# Patient Record
Sex: MALE | Race: WHITE | NOT HISPANIC OR LATINO | Employment: OTHER | URBAN - METROPOLITAN AREA
[De-identification: names, ages, dates, MRNs, and addresses within clinical notes are randomized per-mention and may not be internally consistent; named-entity substitution may affect disease eponyms.]

---

## 2019-04-15 NOTE — PROGRESS NOTES
Subjective:     Encounter Date:04/17/2019    Patient ID: Don Hoang is a 68 y.o.  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    SELF REFERRED  INTERNIST: Marcus Colunga MD    Chief Complaint:   Chief Complaint   Patient presents with   • Hypertension     Consult    • Hyperlipidemia     Problem List:  1. Hypertensive cardiovascular disease:  a. Remote stress test approximately 2011; negative for patient-diet deficit  b. Residual class I symptoms with acceptable EKG, April 2019  2. Hypertension  3. Hyperlipidemia; not on statin therapy, ASCVD 10-year risk 17.4%, 10.4% if treated  4. BPH  5. GERD  6. Hyperplastic colon polyp  7. Osteoarthritis  8. Pedal neuropathy  9. Former tobacco use: One pack per day x20 years, quit 27 years ago  10. Alcohol use; 3-4 beers per day  11. Osteoarthritis  12. Surgical history:  a. Vasectomy  b. Right orchiectomy  c. EGD/colonoscopy  d. Tonsillectomy    Allergies   Allergen Reactions   • Cortisone Rash   • Erythromycin Rash   • Iodine Rash         Current Outpatient Medications:   •  aspirin 81 MG EC tablet, Take 81 mg by mouth Daily., Disp: , Rfl:   •  Biotin 50533 MCG tablet, Take  by mouth Daily., Disp: , Rfl:   •  Cranberry-Vitamin C-Probiotic (AZO CRANBERRY PO), Take  by mouth Daily., Disp: , Rfl:   •  losartan-hydrochlorothiazide (HYZAAR) 100-12.5 MG per tablet, Daily., Disp: , Rfl: 10  •  Methylcellulose, Laxative, (CITRUCEL PO), Take  by mouth Daily., Disp: , Rfl:   •  metoprolol succinate XL (TOPROL-XL) 50 MG 24 hr tablet, Daily., Disp: , Rfl: 9  •  omeprazole (priLOSEC) 20 MG capsule, Daily., Disp: , Rfl: 10  •  Probiotic Product ([employer] PO), Take  by mouth Daily., Disp: , Rfl:   •  Zinc 50 MG capsule, Take  by mouth Daily., Disp: , Rfl:     History of Present Illness  This is a 68-year-old white male who presents to establish cardiology care.  He had a stress test about 7-8 years ago after  "having some shoulder pain.  He has known hypertension, GERD, and hyperlipidemia.  He denies any MIs, heart catheterizations, heart monitors, arrhythmias, rheumatic fever, thyroid dysfunction, kidney dysfunction, CVAs, TIAs, seizures, DVTs, PEs, COPD, asthma, or cardiomegaly.  He was told that, as a child, he had \"symptoms of rheumatic fever\" and had a tonsillectomy.  He remotely smoked 1 pack a day x20 years but quit 27 years ago.  He has no family history of early CAD.  He denies any chest pain, shortness of breath, dizziness, palpitations, presyncope, syncope, or edema.  He is able to perform all of his activities without any difficulties.  He was recently told that he has some neuropathy in his feet.  He denies any diabetes mellitus and states that if his feet are warm, he does not experience this neuropathy.  He denies any claudication while ambulating.  Patient otherwise denies chest pain, shortness of breath, PND, edema, palpitations, syncope or presyncope at this time.    Cardiovascular Disease Risk Factors  hyptertension, hyperlipidemia, increased age, male gender    Social History     Socioeconomic History   • Marital status:      Spouse name: Not on file   • Number of children: 2   • Years of education: Not on file   • Highest education level: Not on file   Tobacco Use   • Smoking status: Former Smoker     Years: 20.00     Types: Cigarettes   • Smokeless tobacco: Never Used   Substance and Sexual Activity   • Alcohol use: Yes   • Drug use: No   • Sexual activity: Defer     · Mother:  from ALS at 76  · Father:  at 79 from neurologic issues  · Brother:  from esophageal cancer  · Brother: Lung cancer  · Son and daughter: Healthy    Review of Systems   Constitution: Negative.   HENT: Positive for hearing loss.    Eyes: Negative.    Cardiovascular: Negative for chest pain, claudication, cyanosis, dyspnea on exertion, irregular heartbeat, leg swelling, near-syncope, orthopnea, " "palpitations, paroxysmal nocturnal dyspnea and syncope.   Respiratory: Negative for shortness of breath, sleep disturbances due to breathing and snoring.    Endocrine: Negative.    Hematologic/Lymphatic: Negative.    Skin: Negative.    Musculoskeletal: Positive for arthritis.   Gastrointestinal: Positive for heartburn. Negative for melena.   Genitourinary: Negative.    Neurological: Positive for numbness. Negative for excessive daytime sleepiness, dizziness, focal weakness and seizures.   Psychiatric/Behavioral: Negative.    Allergic/Immunologic: Negative.       Obtained and negative except as outlined in problem list and HPI.      ECG 12 Lead  Date/Time: 4/17/2019 11:22 AM  Performed by: Jacobo Ramirez MD  Authorized by: Jacobo Ramirez MD   Rhythm comments: Normal sinus rhythm, normal ECG, 75 bpm, QRS 92 ms,  ms,  ms                 Objective:       Vitals:    04/17/19 1002 04/17/19 1003 04/17/19 1004 04/17/19 1006   BP: 172/85 173/81 177/85 171/81   BP Location: Left arm Left arm Right arm Right arm   Patient Position: Sitting Standing Standing Sitting   Pulse: 73 75 78 73   Weight: 99.1 kg (218 lb 6.4 oz)      Height: 188 cm (74\")      Recheck blood pressure left arm sitting was 138/68  Body mass index is 28.04 kg/m².     Physical Exam   Constitutional: He is oriented to person, place, and time. He appears well-developed and well-nourished.   HENT:   Mouth/Throat: Uvula is midline, oropharynx is clear and moist and mucous membranes are normal. He does not have dentures. No oral lesions. Normal dentition. No dental abscesses, uvula swelling, lacerations or dental caries.   Eyes:   Fundoscopic exam:       The right eye shows AV nicking. The right eye shows no exudate and no hemorrhage.        The left eye shows AV nicking. The left eye shows no exudate and no hemorrhage.   Right cataract   Neck: No JVD present. Carotid bruit is not present. No thyromegaly present.   Cardiovascular: Regular rhythm, " S1 normal, S2 normal and normal heart sounds. Exam reveals no gallop, no S3 and no friction rub.   No murmur heard.  Pulses:       Dorsalis pedis pulses are 2+ on the right side, and 2+ on the left side.        Posterior tibial pulses are 2+ on the right side, and 2+ on the left side.   Pulmonary/Chest: Effort normal. He has decreased breath sounds. He has no wheezes. He has no rhonchi. He has no rales.   Abdominal: Soft. He exhibits no mass. There is no hepatosplenomegaly. There is no tenderness. There is no guarding.   Bowel sounds audible x4   Musculoskeletal: Normal range of motion. He exhibits no edema.   Lymphadenopathy:     He has no cervical adenopathy.   Neurological: He is alert and oriented to person, place, and time.   Skin: Skin is warm, dry and intact. No rash noted.   Vitals reviewed.      Lab Review:   11/12/2018:  · CBC: WBC 6.9, MPV 9.6, neutrophils 51.1, lymphocytes 30.4, monocytes 11.4, eos 6.5, RBC 5.26, beta 0.6, hemoglobin 15.7, hematocrit 47.5, MCV 90.3, MCH 29.8, MCHC 33.1, RDW 13.3, platelets 192  · CMP: Glucose 104, BUN 12, creatinine 0.7, sodium 139, potassium 4.7, chloride 100, carbon dioxide 33, calcium 9.3, protein 7.1, albumin 4.2, AST 36, ALT 49, alkaline phosphatase 58, bilirubin 1,   · Lipid panel: Triglycerides 159, cholesterol 117, HDL 37, LDL 48  · PSA - 0.63  · TSH - 3.15        Assessment:   Asymptomatic patient with uncontrolled hypertension.  We will order an echo GXT, CT cardiac calcium score, and add amlodipine 2.5 mg nightly.  Patient's ASCVD 10-year risk is 17.4%, 10.4% if treated.  We will add rosuvastatin 10 mg nightly.  He has concerns about his cardiac risk long-term in view of his remote history of tobacco use, as well as dyslipidemia and hypertension, as well as gender.  We will attempt to risk stratify him.     Diagnosis Plan   1. Essential hypertension  Echocardiographic GXT, amlodipine 2.5 mg nightly, CT cardiac calcium score   2. Hyperlipidemia,  unspecified hyperlipidemia type  ASCVD 10-year risk is 17.4%, 10.4% if treated.  We will add rosuvastatin 10 mg nightly   3. Family history of ASCVD (arteriosclerotic cardiovascular disease)  Echocardiographic GXT, amlodipine 2.5 mg nightly          Plan:       1. Patient to continue current medications and close follow up with the above providers with the following alterations:   A. Initiate amlodipine 2.5 mg hs daily   B. Initiate rosuvastatin 10 mg hs daily  2. Tentative cardiology follow up in 6 weeks or patient may return sooner PRN.  3. Echo GXT ordered  4. CT cardiac calcium score ordered  5. 1 800 card provided    Scribed for Jacobo Ramirez MD by ESDRAS Marin. 4/17/2019  11:26 AM    I, Jacobo Ramirez MD, [facility], personally performed the services described in this documentation as scribed by the above named individual in my presence, and it is both accurate and complete. At 11:49 AM on 04/17/2019

## 2019-04-17 ENCOUNTER — CONSULT (OUTPATIENT)
Dept: CARDIOLOGY | Facility: CLINIC | Age: 69
End: 2019-04-17

## 2019-04-17 VITALS
HEIGHT: 74 IN | WEIGHT: 218.4 LBS | HEART RATE: 73 BPM | SYSTOLIC BLOOD PRESSURE: 171 MMHG | BODY MASS INDEX: 28.03 KG/M2 | DIASTOLIC BLOOD PRESSURE: 81 MMHG

## 2019-04-17 DIAGNOSIS — Z82.49 FAMILY HISTORY OF ASCVD (ARTERIOSCLEROTIC CARDIOVASCULAR DISEASE): ICD-10-CM

## 2019-04-17 DIAGNOSIS — R06.02 SHORTNESS OF BREATH: ICD-10-CM

## 2019-04-17 DIAGNOSIS — E78.5 HYPERLIPIDEMIA, UNSPECIFIED HYPERLIPIDEMIA TYPE: ICD-10-CM

## 2019-04-17 DIAGNOSIS — I10 ESSENTIAL HYPERTENSION: Primary | ICD-10-CM

## 2019-04-17 PROCEDURE — 93000 ELECTROCARDIOGRAM COMPLETE: CPT | Performed by: INTERNAL MEDICINE

## 2019-04-17 PROCEDURE — 99204 OFFICE O/P NEW MOD 45 MIN: CPT | Performed by: INTERNAL MEDICINE

## 2019-04-17 RX ORDER — ASPIRIN 81 MG/1
81 TABLET ORAL DAILY
COMMUNITY

## 2019-04-17 RX ORDER — AMLODIPINE BESYLATE 2.5 MG/1
2.5 TABLET ORAL NIGHTLY
Qty: 30 TABLET | Refills: 11 | Status: SHIPPED | OUTPATIENT
Start: 2019-04-17 | End: 2019-05-31 | Stop reason: SDUPTHER

## 2019-04-17 RX ORDER — GLUCOSAMINE/D3/BOSWELLIA SERRA 1500MG-400
TABLET ORAL DAILY
COMMUNITY

## 2019-04-17 RX ORDER — LOSARTAN POTASSIUM AND HYDROCHLOROTHIAZIDE 12.5; 1 MG/1; MG/1
TABLET ORAL DAILY
Refills: 10 | COMMUNITY
Start: 2019-02-26 | End: 2020-06-05 | Stop reason: DRUGHIGH

## 2019-04-17 RX ORDER — OMEPRAZOLE 20 MG/1
20 CAPSULE, DELAYED RELEASE ORAL EVERY OTHER DAY
Refills: 10 | COMMUNITY
Start: 2019-02-26

## 2019-04-17 RX ORDER — ROSUVASTATIN CALCIUM 10 MG/1
10 TABLET, COATED ORAL DAILY
Qty: 30 TABLET | Refills: 11 | Status: SHIPPED | OUTPATIENT
Start: 2019-04-17 | End: 2019-05-31 | Stop reason: SDUPTHER

## 2019-04-17 RX ORDER — METOPROLOL SUCCINATE 50 MG/1
50 TABLET, EXTENDED RELEASE ORAL DAILY
Refills: 9 | COMMUNITY
Start: 2019-02-26

## 2019-05-16 ENCOUNTER — HOSPITAL ENCOUNTER (OUTPATIENT)
Dept: CT IMAGING | Facility: HOSPITAL | Age: 69
Discharge: HOME | End: 2019-05-16
Admitting: INTERNAL MEDICINE

## 2019-05-16 ENCOUNTER — TELEPHONE (OUTPATIENT)
Dept: CARDIOLOGY | Facility: CLINIC | Age: 69
End: 2019-05-16

## 2019-05-16 ENCOUNTER — HOSPITAL ENCOUNTER (OUTPATIENT)
Dept: CARDIOLOGY | Facility: HOSPITAL | Age: 69
Discharge: HOME | End: 2019-05-16
Admitting: INTERNAL MEDICINE

## 2019-05-16 DIAGNOSIS — Z82.49 FAMILY HISTORY OF ASCVD (ARTERIOSCLEROTIC CARDIOVASCULAR DISEASE): ICD-10-CM

## 2019-05-16 DIAGNOSIS — I10 ESSENTIAL HYPERTENSION: ICD-10-CM

## 2019-05-16 DIAGNOSIS — R06.02 SHORTNESS OF BREATH: ICD-10-CM

## 2019-05-16 DIAGNOSIS — E78.5 HYPERLIPIDEMIA, UNSPECIFIED HYPERLIPIDEMIA TYPE: ICD-10-CM

## 2019-05-16 LAB
BH CV ECHO MEAS - AO ROOT AREA (BSA CORRECTED): 1.6
BH CV ECHO MEAS - AO ROOT AREA: 9.6 CM^2
BH CV ECHO MEAS - AO ROOT DIAM: 3.5 CM
BH CV ECHO MEAS - BSA(HAYCOCK): 2.3 M^2
BH CV ECHO MEAS - BSA: 2.2 M^2
BH CV ECHO MEAS - BZI_BMI: 27.6 KILOGRAMS/M^2
BH CV ECHO MEAS - BZI_METRIC_HEIGHT: 188 CM
BH CV ECHO MEAS - BZI_METRIC_WEIGHT: 97.5 KG
BH CV ECHO MEAS - EDV(CUBED): 84 ML
BH CV ECHO MEAS - EDV(MOD-SP2): 107 ML
BH CV ECHO MEAS - EDV(MOD-SP4): 102 ML
BH CV ECHO MEAS - EDV(TEICH): 86.8 ML
BH CV ECHO MEAS - EF(CUBED): 64.5 %
BH CV ECHO MEAS - EF(MOD-BP): 69 %
BH CV ECHO MEAS - EF(MOD-SP2): 77.6 %
BH CV ECHO MEAS - EF(MOD-SP4): 68.6 %
BH CV ECHO MEAS - EF(TEICH): 56.3 %
BH CV ECHO MEAS - ESV(CUBED): 29.8 ML
BH CV ECHO MEAS - ESV(MOD-SP2): 24 ML
BH CV ECHO MEAS - ESV(MOD-SP4): 32 ML
BH CV ECHO MEAS - ESV(TEICH): 37.9 ML
BH CV ECHO MEAS - FS: 29.2 %
BH CV ECHO MEAS - IVS/LVPW: 1
BH CV ECHO MEAS - IVSD: 0.83 CM
BH CV ECHO MEAS - LA DIMENSION: 3.1 CM
BH CV ECHO MEAS - LA/AO: 0.89
BH CV ECHO MEAS - LV DIASTOLIC VOL/BSA (35-75): 45.5 ML/M^2
BH CV ECHO MEAS - LV MASS(C)D: 111.4 GRAMS
BH CV ECHO MEAS - LV MASS(C)DI: 49.7 GRAMS/M^2
BH CV ECHO MEAS - LV MAX PG: 5 MMHG
BH CV ECHO MEAS - LV MEAN PG: 2 MMHG
BH CV ECHO MEAS - LV SYSTOLIC VOL/BSA (12-30): 14.3 ML/M^2
BH CV ECHO MEAS - LV V1 MAX: 112 CM/SEC
BH CV ECHO MEAS - LV V1 MEAN: 73 CM/SEC
BH CV ECHO MEAS - LV V1 VTI: 23 CM
BH CV ECHO MEAS - LVIDD: 4.4 CM
BH CV ECHO MEAS - LVIDS: 3.1 CM
BH CV ECHO MEAS - LVLD AP2: 6.7 CM
BH CV ECHO MEAS - LVLD AP4: 6.3 CM
BH CV ECHO MEAS - LVLS AP2: 4.7 CM
BH CV ECHO MEAS - LVLS AP4: 5 CM
BH CV ECHO MEAS - LVOT AREA (M): 3.8 CM^2
BH CV ECHO MEAS - LVOT AREA: 3.8 CM^2
BH CV ECHO MEAS - LVOT DIAM: 2.2 CM
BH CV ECHO MEAS - LVPWD: 0.8 CM
BH CV ECHO MEAS - RAP SYSTOLE: 3 MMHG
BH CV ECHO MEAS - RVSP: 33.9 MMHG
BH CV ECHO MEAS - SI(CUBED): 24.2 ML/M^2
BH CV ECHO MEAS - SI(LVOT): 39 ML/M^2
BH CV ECHO MEAS - SI(MOD-SP2): 37 ML/M^2
BH CV ECHO MEAS - SI(MOD-SP4): 31.2 ML/M^2
BH CV ECHO MEAS - SI(TEICH): 21.8 ML/M^2
BH CV ECHO MEAS - SV(CUBED): 54.2 ML
BH CV ECHO MEAS - SV(LVOT): 87.4 ML
BH CV ECHO MEAS - SV(MOD-SP2): 83 ML
BH CV ECHO MEAS - SV(MOD-SP4): 70 ML
BH CV ECHO MEAS - SV(TEICH): 48.8 ML
BH CV ECHO MEAS - TR MAX PG: 30.9 MMHG
BH CV ECHO MEAS - TR MAX VEL: 278 CM/SEC
BH CV STRESS BP STAGE 1: NORMAL
BH CV STRESS BP STAGE 2: NORMAL
BH CV STRESS DURATION MIN STAGE 1: 3
BH CV STRESS DURATION MIN STAGE 2: 3
BH CV STRESS DURATION SEC STAGE 1: 0
BH CV STRESS DURATION SEC STAGE 2: 11
BH CV STRESS ECHO POST STRESS EJECTION FRACTION EF: 75 %
BH CV STRESS GRADE STAGE 1: 10
BH CV STRESS GRADE STAGE 2: 12
BH CV STRESS HR STAGE 1: 133
BH CV STRESS HR STAGE 2: 164
BH CV STRESS METS STAGE 1: 5
BH CV STRESS METS STAGE 2: 7.5
BH CV STRESS PROTOCOL 1: NORMAL
BH CV STRESS RECOVERY BP: NORMAL MMHG
BH CV STRESS RECOVERY HR: 104 BPM
BH CV STRESS SPEED STAGE 1: 1.7
BH CV STRESS SPEED STAGE 2: 2.5
BH CV STRESS STAGE 1: 1
BH CV STRESS STAGE 2: 2
BH CV VAS BP LEFT ARM: NORMAL MMHG
LV EF 2D ECHO EST: 67 %
PERCENT MAX PREDICTED HR: 107.19 %
STRESS BASELINE BP: NORMAL MMHG
STRESS BASELINE HR: 77 BPM
STRESS PERCENT HR: 126 %
STRESS POST ESTIMATED WORKLOAD: 7 METS
STRESS POST EXERCISE DUR MIN: 6 MIN
STRESS POST EXERCISE DUR SEC: 11 SEC
STRESS POST PEAK BP: NORMAL MMHG
STRESS POST PEAK HR: 164 BPM

## 2019-05-16 PROCEDURE — 93320 DOPPLER ECHO COMPLETE: CPT | Performed by: INTERNAL MEDICINE

## 2019-05-16 PROCEDURE — 93325 DOPPLER ECHO COLOR FLOW MAPG: CPT | Performed by: INTERNAL MEDICINE

## 2019-05-16 PROCEDURE — 93320 DOPPLER ECHO COMPLETE: CPT

## 2019-05-16 PROCEDURE — 93018 CV STRESS TEST I&R ONLY: CPT | Performed by: INTERNAL MEDICINE

## 2019-05-16 PROCEDURE — 93325 DOPPLER ECHO COLOR FLOW MAPG: CPT

## 2019-05-16 PROCEDURE — 93350 STRESS TTE ONLY: CPT | Performed by: INTERNAL MEDICINE

## 2019-05-16 PROCEDURE — 75571 CT HRT W/O DYE W/CA TEST: CPT

## 2019-05-16 PROCEDURE — 93017 CV STRESS TEST TRACING ONLY: CPT

## 2019-05-16 PROCEDURE — 93350 STRESS TTE ONLY: CPT

## 2019-05-16 NOTE — TELEPHONE ENCOUNTER
Wife came into office with copies of lab work for patient from 2016, 2017, and 2018.  She is concerned that Dr. Ramirez placed patient on Rosuvastatin based on his triglyceride level from 2017 which she believes is not accurate.    Triglycerides 2016 129  Triglycerides  2017 363  Triglycerides 2018 159    Advised will speak with Dr. Ramirez and let her know his recommendations.

## 2019-05-30 NOTE — PROGRESS NOTES
Subjective:     Encounter Date:05/31/2019    Patient ID: Papo Hoang is a 68 y.o.  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     SELF REFERRED  INTERNIST: Marcus Colunga MD    Chief Complaint:   Chief Complaint   Patient presents with   • Hypertension   • Hyperlipidemia     Problem List:  1. Hypertensive cardiovascular disease:  a. Remote stress test approximately 2011; negative for patient-diet deficit  b. Residual class I symptoms with acceptable EKG, April 2019, with acceptable echocardiographic exercise stress test suggestive of low probability for significant focal obstructive coronary artery disease with preserved systolic left ventricular function following exercise to 79% predicted exercise capacity and 107% of predicted maximum heart rate suggestive of mild physical deconditioning and cardiac CT calcium score of 26.3, May 2019.  c. Residual class I symptoms, May 2019  2. Hypertension - probably essential  3. Hyperlipidemia; now on statin therapy, ASCVD 10-year risk 17.4%, 10.4% if treated  4. BPH  5. GERD  6. Hyperplastic colon polyp  7. Osteoarthritis  8. Pedal neuropathy  9. Former tobacco use: One pack per day x20 years; quit 27 years ago  10. Alcohol use; 3-4 beers per day  11. Osteoarthritis  12. Surgical history:  a. Vasectomy  b. Right orchiectomy  c. EGD/colonoscopy  d. Tonsillectomy       Allergies   Allergen Reactions   • Cortisone Rash   • Erythromycin Rash   • Iodine Rash       Current Outpatient Medications:   •  amLODIPine (NORVASC) 2.5 MG tablet, Take 1 tablet by mouth Every Night., Disp: 30 tablet, Rfl: 11  •  aspirin 81 MG EC tablet, Take 81 mg by mouth Daily., Disp: , Rfl:   •  Biotin 88475 MCG tablet, Take  by mouth Daily., Disp: , Rfl:   •  Cranberry-Vitamin C-Probiotic (AZO CRANBERRY PO), Take  by mouth Daily., Disp: , Rfl:   •  losartan-hydrochlorothiazide (HYZAAR) 100-12.5 MG per tablet, Daily., Disp: , Rfl: 10  •   "Methylcellulose, Laxative, (CITRUCEL PO), Take  by mouth Daily., Disp: , Rfl:   •  metoprolol succinate XL (TOPROL-XL) 50 MG 24 hr tablet, Daily., Disp: , Rfl: 9  •  omeprazole (priLOSEC) 20 MG capsule, Every Other Day., Disp: , Rfl: 10  •  Probiotic Product ([employer] PO), Take  by mouth Daily., Disp: , Rfl:   •  rosuvastatin (CRESTOR) 10 MG tablet, Take 1 tablet by mouth Daily., Disp: 30 tablet, Rfl: 11  •  Zinc 50 MG capsule, Take  by mouth Daily., Disp: , Rfl:     History of Present Illness: Patient returns for scheduled 6-week followup after his initial consult on 04/17/2019.  He is advised that his tests were acceptable.  He is encouraged to try to lose some weight over the next several months.  He says that he feels good and has been checking his blood pressure at home.  His systolic blood pressures, recorded by his wife, have ranged from 131-153/72-87 with heart rates ranging from 56 to 70 beats per minute.  He is told to continue taking his statin drug and his daily baby aspirin.  He walks a lot on his job but says \"they're the wrong kind of steps\"; however, he is told that any steps are good.  Patient otherwise denies chest pain, shortness of breath, PND, edema, palpitations, syncope or presyncope at this time.        ROS   Obtained and negative except as outlined in problem list and HPI.    Procedures       Objective:       Vitals:    05/31/19 1521 05/31/19 1522 05/31/19 1535   BP: 133/74 137/95 142/78   BP Location: Left arm Left arm Right arm   Patient Position: Sitting Standing Sitting   Pulse: 58 66    Weight: 101 kg (222 lb 3.2 oz)     Height: 188 cm (74\")       Body mass index is 28.53 kg/m².   Last weight:  218 lbs.    Physical Exam   Constitutional: He is oriented to person, place, and time. He appears well-developed and well-nourished.   Neck: No JVD present. Carotid bruit is not present. No thyromegaly present.   Cardiovascular: Regular rhythm, S1 normal, S2 normal and normal heart " sounds. Exam reveals no gallop, no S3 and no friction rub.   No murmur heard.  Pulses:       Dorsalis pedis pulses are 2+ on the right side, and 2+ on the left side.        Posterior tibial pulses are 2+ on the right side, and 2+ on the left side.   Pulmonary/Chest: Effort normal and breath sounds normal. He has no wheezes. He has no rhonchi. He has no rales.   Abdominal: Soft. He exhibits no mass. There is no hepatosplenomegaly. There is no tenderness. There is no guarding.   Bowel sounds audible x4   Musculoskeletal: Normal range of motion. He exhibits no edema.   Lymphadenopathy:     He has no cervical adenopathy.   Neurological: He is alert and oriented to person, place, and time.   Skin: Skin is warm, dry and intact. No rash noted.   Vitals reviewed.        Lab Review:   · Cardiac CT calcium score, 05/16/2019:  FINDINGS: Cardiac size within normal limits without pericardial effusion. Single distinct calcified plaque lesion within the proximal LAD with volume calculated at 21.3 cu mm corresponding to a calcium score calculated at 26.3 placing patient in mild calcification risk  category. Coronaries otherwise free from calcified atherosclerotic involvement.     Adjacent lung parenchyma and mediastinal grossly unremarkable on limited evaluation.     IMPRESSION: Cardiac calcium score calculated at 26.3 with single detected lesion in the proximal LAD placing patient in minimal risk category with minimal increase risk of major coronary event in the next 12 months.    · Stress echocardiogram, 05/16/2019:  · No chest pain or discomfort.  · No significant EKG changes; occasional PVCs and PACs without complex forms.  · Hypertensive BP response; patient did not have any medications this am and instructed to take when test was completed.  · THR of 130/minute achieved at 1:35 minutes; maximum /minute = 107% of MPHR.  · Expected exercise time = 7:40 minutes; actual time = 6:11 minutes; test stopped due to fatigue and  hypertension.  · CAROLYNE +21; BMI 28.  · Estimated EF = 67%.  · Left ventricular systolic function is normal.  · Mild tricuspid valve regurgitation is present.  · Normal right ventricular cavity size, wall thickness, systolic function and septal motion noted.  · There is no evidence of a left ventricular mass or thrombus present.  · No evidence of pulmonary hypertension is present.  · There is no evidence of pericardial effusion.  · No significant structural valvular abnormality demonstrated.  · Acceptable echocardiographic exercise stress test suggestive of low probability for significant focal obstructive coronary artery disease with preserved systolic left ventricular function following exercise to 79% predicted exercise capacity and 107% of predicted maximum heart rate suggestive of mild physical deconditioning.          Assessment:       Overall continued acceptable course with no interim cardiopulmonary complaints with good functional status. We will defer additional diagnostic or therapeutic intervention from a cardiac perspective at this time.     Diagnosis Plan   1. Hypertensive heart disease without heart failure  No recurrent angina pectoris or CHF on current activity schedule; continue current treatment     2. Essential hypertension  Labile readings; continue efforts at regular exercise and reduction of weight, hopefully over the next year, by 10-15 pounds with heart healthy diet   3. Dyslipidemia  Continue current treatment and follow closely with Dr. Colunga; continue rosuvastatin          Plan:         1. Patient to continue current medications and close follow up with the above providers.  2. Travel packet will be provided.  3. Tentative cardiology follow up in May or June 2020, or patient may return sooner PRN.    Transcribed by Tila Mcfarlane for Dr. Jacobo Ramirez at 3:33 PM on 05/31/2019    IJacobo MD, [facility], personally performed the services described in this documentation as scribed  by the above named individual in my presence, and it is both accurate and complete. At 3:52 PM on 05/31/2019

## 2019-05-31 ENCOUNTER — OFFICE VISIT (OUTPATIENT)
Dept: CARDIOLOGY | Facility: CLINIC | Age: 69
End: 2019-05-31

## 2019-05-31 VITALS
HEART RATE: 66 BPM | BODY MASS INDEX: 28.52 KG/M2 | DIASTOLIC BLOOD PRESSURE: 78 MMHG | HEIGHT: 74 IN | SYSTOLIC BLOOD PRESSURE: 142 MMHG | WEIGHT: 222.2 LBS

## 2019-05-31 DIAGNOSIS — E78.5 DYSLIPIDEMIA: ICD-10-CM

## 2019-05-31 DIAGNOSIS — I11.9 HYPERTENSIVE HEART DISEASE WITHOUT HEART FAILURE: Primary | ICD-10-CM

## 2019-05-31 DIAGNOSIS — I10 ESSENTIAL HYPERTENSION: ICD-10-CM

## 2019-05-31 DIAGNOSIS — E78.5 HYPERLIPIDEMIA, UNSPECIFIED HYPERLIPIDEMIA TYPE: ICD-10-CM

## 2019-05-31 DIAGNOSIS — Z82.49 FAMILY HISTORY OF ASCVD (ARTERIOSCLEROTIC CARDIOVASCULAR DISEASE): ICD-10-CM

## 2019-05-31 PROCEDURE — 99214 OFFICE O/P EST MOD 30 MIN: CPT | Performed by: INTERNAL MEDICINE

## 2019-05-31 RX ORDER — AMLODIPINE BESYLATE 2.5 MG/1
2.5 TABLET ORAL NIGHTLY
Qty: 90 TABLET | Refills: 3 | Status: SHIPPED | OUTPATIENT
Start: 2019-05-31 | End: 2020-06-05

## 2019-05-31 RX ORDER — ROSUVASTATIN CALCIUM 10 MG/1
10 TABLET, COATED ORAL DAILY
Qty: 90 TABLET | Refills: 3 | Status: SHIPPED | OUTPATIENT
Start: 2019-05-31 | End: 2020-06-05

## 2019-06-03 ENCOUNTER — TELEPHONE (OUTPATIENT)
Dept: CARDIOLOGY | Facility: CLINIC | Age: 69
End: 2019-06-03

## 2019-06-03 NOTE — TELEPHONE ENCOUNTER
Patient's wife called today because she states pt has been experiencing some joint pain and trouble sleeping because of this. She wanted to let K[facility] know that she has cut his Rosuvastatin from 10 mg to 5 mg on Saturday night and his pain has gotten better. They want to know if he can continue this dose? Last lipid was November 2018 LDL was 48 and triglycerides were 159.

## 2020-01-16 ENCOUNTER — TELEPHONE (OUTPATIENT)
Dept: CARDIOLOGY | Facility: CLINIC | Age: 70
End: 2020-01-16

## 2020-01-16 NOTE — TELEPHONE ENCOUNTER
Pt's wife called and stated that pt has quit taking rosuvastatin and amlodipine because it is making him feel fatigued.    Advised pt's wife that pt should continue rosuvastatin and amlodipine.    Pt's BP running in 130s systolic.    Pt's wife states that the pt wants to stop medications (amlodipine and rosuvastatin) and try diet and exercise to manage cholesterol and BP between now and 6/5/20 appt.    Advised pt's wife that was not an immediate solution and he needed to continue current medications in the mean time. I advised pt's wife that an alternative to these medications could be called in.     Pt's wife declined.    Pt's wife stated that she would have pt monitor his BP.    Advised pt's wife to let me know if pt's BP maintained >140/90.    Pt's wife verbalizes understanding and agreeable to plan.

## 2020-06-04 NOTE — PROGRESS NOTES
Subjective:     Encounter Date:06/05/2020    Patient ID: Papo Hoang is a 69 y.o.  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     SELF REFERRED  INTERNIST: Marcus Colunga MD    Chief Complaint:   Chief Complaint   Patient presents with   • Hypertensive heart disease without heart failure     f/u     Problem List:  1. Hypertensive cardiovascular disease:  a. Remote stress test approximately 2011; negative for patient-diet deficit  b. Residual class I symptoms with acceptable EKG, April 2019, with acceptable echocardiographic exercise stress test suggestive of low probability for significant focal obstructive coronary artery disease with preserved systolic left ventricular function following exercise to 79% predicted exercise capacity and 107% of predicted maximum heart rate suggestive of mild physical deconditioning and cardiac CT calcium score of 26.3, May 2019.  c. Residual class I symptoms, May 2019, June 2020  2. Hypertension - probably essential  3. Hyperlipidemia;  ASCVD 10-year risk 17.4%, 10.4% if treated, patient discontinued rosuvastatin January 2020 due to fatigue and myalgias  4. BPH  5. GERD  6. Hyperplastic colon polyp  7. Osteoarthritis  8. Pedal neuropathy  9. Former tobacco use: One pack per day x20 years; quit 27 years ago  10. Alcohol use; 3-4 beers per day  11. Osteoarthritis  12. Surgical history:  a. Vasectomy  b. Right orchiectomy  c. EGD/colonoscopy  d. Tonsillectomy     Allergies   Allergen Reactions   • Cortisone Rash   • Erythromycin Rash   • Iodine Rash         Current Outpatient Medications:   •  aspirin 81 MG EC tablet, Take 81 mg by mouth Daily., Disp: , Rfl:   •  Biotin 04056 MCG tablet, Take  by mouth Daily., Disp: , Rfl:   •  Cranberry-Vitamin C-Probiotic (AZO CRANBERRY PO), Take  by mouth Daily., Disp: , Rfl:   •  losartan-hydrochlorothiazide (HYZAAR) 100-12.5 MG per tablet, Daily., Disp: , Rfl: 10  •   Methylcellulose, Laxative, (CITRUCEL PO), Take  by mouth Daily., Disp: , Rfl:   •  metoprolol succinate XL (TOPROL-XL) 50 MG 24 hr tablet, Daily., Disp: , Rfl: 9  •  omeprazole (priLOSEC) 20 MG capsule, Every Other Day., Disp: , Rfl: 10  •  Probiotic Product (Globoforce PO), Take  by mouth Daily., Disp: , Rfl:   •  Zinc 50 MG capsule, Take  by mouth Daily., Disp: , Rfl:     HISTORY OF PRESENT ILLNESS: Patient returns for scheduled annual followup. His wife called our office in early June 2019 to let us know that they had cut his rosuvastatin dose to 5 mg daily because of joint pain.  She called again in mid-January 2020 to advise that the patient had completely discontinued rosuvastatin and amlodipine due to feeling fatigued.  Patient was encouraged to restart those medications while he attempted to control his blood pressure and cholesterol with diet and exercise, but patient's wife declined.  The patient states that he is feeling much better since he stopped the rosuvastatin or amlodipine.  His blood pressure at home is normally around 140/70.  He is active and does bush hogging and walks 1-2 miles per day without any cardiopulmonary complaints.  The patient denies any chest pain, shortness of breath, edema, palpitations, dizziness, presyncope, or syncope.  He has not had any new laboratory testing since November 2019, and at that time, his cholesterol levels were acceptable while he was on low-dose rosuvastatin-data deficit.  He is not scheduled to have laboratory testing again until November 2020.  He had a delightful 6-month visit to [place], [place].        Review of Systems   All other systems reviewed and are negative.     All other systems reviewed and otherwise negative.    Procedures       Objective:       Vitals:    06/05/20 1105 06/05/20 1108   BP: 148/85 151/69   BP Location: Right arm Right arm   Patient Position: Sitting Standing   Pulse: 55 52   Weight: 94.5 kg (208 lb 6.4 oz)   "  Height: 188 cm (74\")    Recheck blood pressure right arm sitting was 140/70  Body mass index is 26.76 kg/m².   Last weight:  222 lbs.    Physical Exam   Constitutional: He is oriented to person, place, and time. He appears well-developed and well-nourished.   Neck: No JVD present. Carotid bruit is not present. No thyromegaly present.   Cardiovascular: Regular rhythm, S1 normal, S2 normal and normal heart sounds. Exam reveals no gallop, no S3 and no friction rub.   No murmur heard.  Pulses:       Dorsalis pedis pulses are 2+ on the right side, and 2+ on the left side.        Posterior tibial pulses are 2+ on the right side, and 2+ on the left side.   Pulmonary/Chest: Effort normal and breath sounds normal. He has no wheezes. He has no rhonchi. He has no rales.   Abdominal: Soft. He exhibits no mass. There is no hepatosplenomegaly. There is no tenderness. There is no guarding.   Bowel sounds audible x4   Musculoskeletal: Normal range of motion. He exhibits no edema.   Lymphadenopathy:     He has no cervical adenopathy.   Neurological: He is alert and oriented to person, place, and time.   Skin: Skin is warm, dry and intact. No rash noted.   Vitals reviewed.        Lab Review: No new labs available to review          Assessment:   Overall continued acceptable course with no new interim cardiopulmonary complaints with good functional status. We will defer additional diagnostic or therapeutic intervention from a cardiac perspective at this time.  The patient is no longer on his rosuvastatin or amlodipine.  We will provide slips for an FLP and an hs-CRP.  His previous ASCVD 10-year risk was 17.4%, 10.4% if treated, and we may need to consider a PCSK9 inhibitor versus bempedoic acid.  His blood pressure is elevated, so we will increase his losartan/HCTZ to 100 mg / 25 mg daily.       Diagnosis Plan   1. Hypertensive heart disease without heart failure  No recurrent angina pectoris or CHF on current activity schedule; " continue current treatment   2. Essential hypertension  Increase his losartan/HCTZ to 100 mg / 25 mg daily   3. Dyslipidemia  FLP, hsCRP slips          Plan:         1. Patient to continue current medications and close follow up with the above providers other than to increase his losartan/HCTZ dose to 100 mg/25 mg daily  2. Tentative cardiology follow up in June 2021, or patient may return sooner PRN.  3. FLP, hsCRP slips provided patient  4. Fax number is provided so patient can hopefully have his most recent lab results forwarded to our office.      Scribed for Jacobo Ramirez MD by Gia Starkey, ESDRAS. 6/5/2020  11:24    I, Jacobo Ramirez MD, FACC, personally performed the services described in this documentation as scribed by the above named individual in my presence, and it is both accurate and complete. At 11:40 AM on 06/05/2020

## 2020-06-05 ENCOUNTER — OFFICE VISIT (OUTPATIENT)
Dept: CARDIOLOGY | Facility: CLINIC | Age: 70
End: 2020-06-05

## 2020-06-05 VITALS
WEIGHT: 208.4 LBS | HEART RATE: 52 BPM | DIASTOLIC BLOOD PRESSURE: 69 MMHG | HEIGHT: 74 IN | BODY MASS INDEX: 26.75 KG/M2 | SYSTOLIC BLOOD PRESSURE: 151 MMHG

## 2020-06-05 DIAGNOSIS — E78.5 DYSLIPIDEMIA: ICD-10-CM

## 2020-06-05 DIAGNOSIS — I11.9 HYPERTENSIVE HEART DISEASE WITHOUT HEART FAILURE: Primary | ICD-10-CM

## 2020-06-05 DIAGNOSIS — I10 ESSENTIAL HYPERTENSION: ICD-10-CM

## 2020-06-05 PROCEDURE — 99214 OFFICE O/P EST MOD 30 MIN: CPT | Performed by: INTERNAL MEDICINE

## 2020-06-05 RX ORDER — LOSARTAN POTASSIUM AND HYDROCHLOROTHIAZIDE 25; 100 MG/1; MG/1
1 TABLET ORAL DAILY
Qty: 90 TABLET | Refills: 1 | Status: SHIPPED | OUTPATIENT
Start: 2020-06-05 | End: 2021-06-09

## 2021-06-07 NOTE — PROGRESS NOTES
Subjective:     Encounter Date:06/09/2021    Patient ID: Papo Hoang is a 70 y.o.  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     SELF REFERRED  INTERNIST: Marcus Colunga MD    Chief Complaint:   Chief Complaint   Patient presents with   • Hypertensive heart disease without heart failure     f/u       Problem List:  1. Hypertensive cardiovascular disease:  a. Remote stress test approximately 2011; negative for patient-diet deficit  b. Residual class I symptoms with acceptable EKG, April 2019, with acceptable echocardiographic exercise stress test suggestive of low probability for significant focal obstructive coronary artery disease with preserved systolic left ventricular function following exercise to 79% predicted exercise capacity and 107% of predicted maximum heart rate suggestive of mild physical deconditioning and cardiac CT calcium score of 26.3, May 2019.  c. Residual class I symptoms, May 2019, June 2020, June 2021  2. Hypertension - probably essential  3. Hyperlipidemia;  ASCVD 10-year risk 17.4%, 10.4% if treated, patient discontinued rosuvastatin January 2020 due to fatigue and myalgias  4. BPH  5. GERD  6. Hyperplastic colon polyp  7. Osteoarthritis  8. Pedal neuropathy  9. Former tobacco use: One pack per day x20 years; quit 27 years ago  10. Alcohol use; 3-4 beers per day  11. Osteoarthritis  12. Surgical history:  a. Vasectomy  b. Right orchiectomy  c. EGD/colonoscopy  d. Tonsillectomy      Allergies   Allergen Reactions   • Cortisone Rash   • Erythromycin Rash   • Iodine Rash       Current Outpatient Medications:   •  aspirin 81 MG EC tablet, Take 81 mg by mouth Daily., Disp: , Rfl:   •  Biotin 31255 MCG tablet, Take  by mouth Daily., Disp: , Rfl:   •  Cranberry-Vitamin C-Probiotic (AZO CRANBERRY PO), Take  by mouth Daily., Disp: , Rfl:   •  hydroCHLOROthiazide (MICROZIDE) 12.5 MG capsule, Take 12.5 mg by mouth Daily., Disp: ,  "Rfl:   •  losartan (COZAAR) 100 MG tablet, Take 100 mg by mouth Daily., Disp: , Rfl:   •  metoprolol succinate XL (TOPROL-XL) 50 MG 24 hr tablet, Daily., Disp: , Rfl: 9  •  omeprazole (priLOSEC) 20 MG capsule, Every Other Day., Disp: , Rfl: 10  •  Probiotic Product ([employer] PO), Take  by mouth Daily., Disp: , Rfl:   •  Zinc 50 MG capsule, Take  by mouth Daily., Disp: , Rfl:     History of Present Illness: Patient returns for a scheduled annual follow up. He has been doing well overall from a cardiopulmonary standpoint. He monitors his blood pressure at home which normally is around 145/70. He mentions his blood pressure may also range between 180/80 and 150/65 as well. He reports eating unhealthy this past year and plans on improving his diet. Patient denies chest pain, shortness of breath, palpitations, edema, dizziness, and syncope. He and his wife both have appointments today and mentions she has brought his most recent laboratory studies by hand to the office today; see below. He received both of his Coronavirus immunization in [place], February 2020. He and his wife visited [place] for a couple of months and returned April 2021.  Patient has had no interim ER visits, hospitalizations, serious illnesses, or injuries. He is asymptomatic and active.         ROS   Obtained and negative except as outlined in problem list and HPI.    Procedures       Objective:       Vitals:    06/09/21 1051 06/09/21 1052 06/09/21 1110   BP: 156/78 162/82 140/76   BP Location: Right arm Right arm    Patient Position: Sitting Standing    Pulse: 56 60    SpO2: 98%     Weight: 100 kg (220 lb 12.8 oz)     Height: 188 cm (74\")       Body mass index is 28.35 kg/m².  Last weight: 208 lb    Vitals reviewed.   Constitutional:       Appearance: Well-developed.   Neck:      Thyroid: No thyromegaly.      Vascular: No carotid bruit or JVD.      Lymphadenopathy: No cervical adenopathy.   Pulmonary:      Effort: Pulmonary effort is " normal.      Breath sounds: Normal breath sounds. No wheezing. No rhonchi. No rales.   Cardiovascular:      Regular rhythm.      Murmurs: There is a grade 1/6 harsh midsystolic murmur at the URSB, radiating to the neck.      No gallop. No S3 gallop.   Pulses:     Dorsalis pedis: 2+ bilaterally.     Posterior tibial: 2+ bilaterally.  Edema:     Peripheral edema absent.   Abdominal:      Palpations: Abdomen is soft. There is no abdominal mass.      Tenderness: There is no abdominal tenderness. There is no guarding.   Musculoskeletal: Normal range of motion. Skin:     General: Skin is warm and dry.      Findings: No rash.   Neurological:      Mental Status: Alert and oriented to person, place, and time.           Lab Review:     Lab date: 11/12/2020  • FLP: , , HDL 38, LDL 51  • CMP: Glu 101, BUN 11, Creat 0.7, eGFR 113, Na 139, K 4.4, Cl 99, CO2 31, Ca 9.5, Alk Phos 59, AST 23, ALT 26  • CBC: WBC 6.5, RBC 5.35, HGB 15.9, HCT 47.6, MCV 89.0, MCH 47.6,   • HbA1c: 5.7  • TSH: 3.15  • Cardiac C-Reactive Protein: 2.15            Assessment:       Overall continued acceptable course with no new interim cardiopulmonary complaints with good functional status. We will defer additional diagnostic or therapeutic intervention from a cardiac perspective at this time.     Diagnosis Plan   1. Hypertensive heart disease without heart failure  No recurrent angina pectoris or CHF on current activity schedule; continue current treatment.   2. Essential hypertension  Acceptable control; continue current treatment.   3. Dyslipidemia  Well controlled; encouraged heart healthy diet          Plan:         1. Patient to continue current medications and close follow up with the above providers.  2. Tentative cardiology follow up in June 2022 or patient may return sooner PRN.    Scribed for Jacobo Ramirez MD by Kat Berry. 6/9/2021 11:06 EDT    I, Jacobo Ramirez MD, [facility], personally performed the services described in  this documentation as scribed by the above named individual in my presence, and it is both accurate and complete. At 11:45 EDT on 06/09/2021

## 2021-06-09 ENCOUNTER — OFFICE VISIT (OUTPATIENT)
Dept: CARDIOLOGY | Facility: CLINIC | Age: 71
End: 2021-06-09

## 2021-06-09 VITALS
WEIGHT: 220.8 LBS | OXYGEN SATURATION: 98 % | SYSTOLIC BLOOD PRESSURE: 140 MMHG | HEIGHT: 74 IN | HEART RATE: 60 BPM | BODY MASS INDEX: 28.34 KG/M2 | DIASTOLIC BLOOD PRESSURE: 76 MMHG

## 2021-06-09 DIAGNOSIS — E78.5 DYSLIPIDEMIA: ICD-10-CM

## 2021-06-09 DIAGNOSIS — I11.9 HYPERTENSIVE HEART DISEASE WITHOUT HEART FAILURE: Primary | ICD-10-CM

## 2021-06-09 DIAGNOSIS — I10 ESSENTIAL HYPERTENSION: ICD-10-CM

## 2021-06-09 PROCEDURE — 99214 OFFICE O/P EST MOD 30 MIN: CPT | Performed by: INTERNAL MEDICINE

## 2021-06-09 RX ORDER — HYDROCHLOROTHIAZIDE 12.5 MG/1
12.5 CAPSULE, GELATIN COATED ORAL EVERY MORNING
COMMUNITY
Start: 2021-05-28 | End: 2022-06-10 | Stop reason: DRUGHIGH

## 2021-06-09 RX ORDER — LOSARTAN POTASSIUM 100 MG/1
100 TABLET ORAL DAILY
COMMUNITY
Start: 2021-05-28

## 2022-06-10 ENCOUNTER — OFFICE VISIT (OUTPATIENT)
Dept: CARDIOLOGY | Facility: CLINIC | Age: 72
End: 2022-06-10

## 2022-06-10 VITALS
SYSTOLIC BLOOD PRESSURE: 140 MMHG | HEART RATE: 58 BPM | BODY MASS INDEX: 27.72 KG/M2 | WEIGHT: 216 LBS | OXYGEN SATURATION: 97 % | DIASTOLIC BLOOD PRESSURE: 68 MMHG | HEIGHT: 74 IN

## 2022-06-10 DIAGNOSIS — I11.9 HYPERTENSIVE HEART DISEASE WITHOUT HEART FAILURE: Primary | ICD-10-CM

## 2022-06-10 DIAGNOSIS — E78.5 DYSLIPIDEMIA: ICD-10-CM

## 2022-06-10 DIAGNOSIS — I10 ESSENTIAL HYPERTENSION: ICD-10-CM

## 2022-06-10 PROCEDURE — 99214 OFFICE O/P EST MOD 30 MIN: CPT | Performed by: INTERNAL MEDICINE

## 2022-06-10 RX ORDER — HYDROCHLOROTHIAZIDE 25 MG/1
25 TABLET ORAL DAILY
Qty: 90 TABLET | Refills: 3 | Status: SHIPPED | OUTPATIENT
Start: 2022-06-10

## 2022-06-10 NOTE — PROGRESS NOTES
Subjective:     Encounter Date:06/10/2022    Patient ID: Papo Hoang is a 71 y.o.  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     SELF REFERRED  INTERNIST: Marcus Colunga MD    Chief Complaint:   Chief Complaint   Patient presents with   • hypertensive heart disease without heart failure        Problem List:  1. Hypertensive cardiovascular disease:  a. Remote stress test approximately 2011; negative for patient-diet deficit  b. Residual class I symptoms with acceptable EKG, April 2019, with acceptable echocardiographic exercise stress test suggestive of low probability for significant focal obstructive coronary artery disease with preserved systolic left ventricular function following exercise to 79% predicted exercise capacity and 107% of predicted maximum heart rate suggestive of mild physical deconditioning and cardiac CT calcium score of 26.3, May 2019.  c. Residual class I symptoms, May 2019, June 2020, June 2021, June 2022  2. Hypertension - probably essential  3. Hyperlipidemia;  ASCVD 10-year risk 17.4%, 10.4% if treated, patient discontinued rosuvastatin January 2020 due to fatigue and myalgias  4. BPH  5. GERD  6. Hyperplastic colon polyp  7. Osteoarthritis  8. Pedal neuropathy  9. Former tobacco use: One pack per day x20 years; quit 27 years ago  10. Alcohol use; 3-4 beers per day  11. Osteoarthritis  12. Surgical history:  a. Vasectomy  b. Right orchiectomy  c. EGD/colonoscopy  d. Tonsillectomy    Allergies   Allergen Reactions   • Cortisone Rash   • Erythromycin Rash   • Iodine Rash       Current Outpatient Medications:   •  aspirin 81 MG EC tablet, Take 81 mg by mouth Daily., Disp: , Rfl:   •  Cetirizine HCl (ZYRTEC PO), Take 1 tablet by mouth As Needed., Disp: , Rfl:   •  Cranberry-Vitamin C-Probiotic (AZO CRANBERRY PO), Take  by mouth Daily., Disp: , Rfl:   •  GARLIC PO, Take 1,000 mg by mouth Daily., Disp: , Rfl:   •   hydroCHLOROthiazide (MICROZIDE) 12.5 MG capsule, Take 12.5 mg by mouth Every Morning., Disp: , Rfl:   •  losartan (COZAAR) 100 MG tablet, Take 100 mg by mouth Daily., Disp: , Rfl:   •  metoprolol succinate XL (TOPROL-XL) 50 MG 24 hr tablet, Take 50 mg by mouth Daily., Disp: , Rfl: 9  •  Omega-3 Fatty Acids (OMEGA 3 PO), Take 1 tablet by mouth Daily. With fish oil, Disp: , Rfl:   •  omeprazole (priLOSEC) 20 MG capsule, Take 20 mg by mouth Every Other Day., Disp: , Rfl: 10  •  Probiotic Product (Symwave PO), Take  by mouth Daily., Disp: , Rfl:   •  Unable to find, 1 each 1 (One) Time. Med Name: [place] 600 Full Spectrum Hemp Extract CBD oil, Disp: , Rfl:   •  Biotin 65506 MCG tablet, Take  by mouth Daily., Disp: , Rfl:   •  Zinc 50 MG capsule, Take  by mouth Daily., Disp: , Rfl:     History of Present Illness: Patient returns for scheduled 12-month follow up. Patient reports that he and his wife are [place], and it is keeping them busy. He reports that he is having some trouble sleeping, and has mild restless leg syndrome, but does not want to take additional medication. He is able to do his day to day activities without cardiopulmonary complaints. He is able to sleep well at night, and wakes up feeling well rested but hungry. He has had both his COVID vaccinations and the first booster. He hand carries laboratory studies with him as outlined below; these were reviewed with him in office. He monitors his blood pressure at home, and it typically around 123-153/47-61 with pulses ranging 47 to 61. Patient denies chest pain, shortness of breath, orthopnea, palpitations, edema, dizziness, and syncope.  He has had no interim ER visits, hospitalizations, serious illnesses, or surgeries.    ROS   Obtained and negative except as outlined in problem list and HPI.    Procedures       Objective:       Vitals:    06/10/22 1117 06/10/22 1118   BP: 170/74 172/78   BP  "Location: Left arm Left arm   Patient Position: Sitting Standing   Pulse: 56 58   SpO2: 96% 97%   Weight: 98 kg (216 lb)    Height: 188 cm (74\")      Body mass index is 27.73 kg/m².  Last weight: 220 lbs    Vitals reviewed.   Constitutional:       Appearance: Well-developed.   Neck:      Thyroid: No thyromegaly.      Vascular: No carotid bruit or JVD.      Lymphadenopathy: No cervical adenopathy.   Pulmonary:      Effort: Pulmonary effort is normal.      Breath sounds: Normal breath sounds. No wheezing. No rhonchi. No rales.   Cardiovascular:      Regular rhythm.      Murmurs: There is a mid frequency early systolic murmur at the URSB.      No gallop. No S3 gallop.   Pulses:     Dorsalis pedis: 2+ bilaterally.     Posterior tibial: 2+ bilaterally.  Edema:     Peripheral edema absent.   Abdominal:      Palpations: Abdomen is soft. There is no abdominal mass.      Tenderness: There is no abdominal tenderness.   Musculoskeletal: Normal range of motion. Skin:     General: Skin is warm and dry.      Findings: No rash.   Neurological:      Mental Status: Alert and oriented to person, place, and time.           Lab Review:     Lab date: 04/15/2022 (reviewed with patient in office; this was non-fasting specimen)  • FLP: , , HDL 42, LDL 34  • CMP: Glu 122, BUN 10, Creat 0.7, eGFR 113, Na 132, K 4.1, Cl 92, CO2 32, Ca 9.2  • LFT: Alk Phos 56, AST 30, ALT 25, Bili 0.7, Alb 4.5, Protein 7.2  • CBC: WBC 8.9, RBC 5.25, HGB 15.7, HCT 46.8, MCV 89.1, MCH 29.9,   • HbA1c: 5.9  • TSH: 1.52   • PSA 0.77          Assessment:       Overall continued acceptable course with no new interim cardiopulmonary complaints with good functional status. We will defer additional diagnostic or therapeutic intervention from a cardiac perspective at this time, other than to change HCTZ to 25 mg daily.     Diagnosis Plan   1. Hypertensive heart disease without heart failure  Stable and asymptomatic. Continue current treatment.   2. " Essential hypertension  Adequate control. Continue current treatment   3. Dyslipidemia  Well controlled. Continue current treatment.          Plan:         1. Patient to continue current medications and close follow up with the above providers, and change HCTZ to 25 mg daily.  2. Tentative cardiology follow up in June 2023 or patient may return sooner PRN.    Scribed for Jacobo Ramirez MD by Lucretia Ennis. 6/10/2022  11:27 EDT    I, Jacobo Ramirez MD, [facility], personally performed the services described in this documentation as scribed by the above named individual in my presence, and it is both accurate and complete. At 11:46 EDT on 06/10/2022

## 2022-11-17 ENCOUNTER — DOCUMENTATION (OUTPATIENT)
Dept: CARDIOLOGY | Facility: CLINIC | Age: 72
End: 2022-11-17

## 2022-11-17 NOTE — PROGRESS NOTES
I called and spoke with the patient's wife regarding his laboratory testing results that were sent at the same time as hers with recommendations to limit sugars, fatty/fried foods.    4/15/2022:  · TSH 1.52  · PSA 0.77  · Hemoglobin A1c 5.9%  · Lipid panel: Cholesterol 110, triglycerides 171, HDL 42, LDL 34  · CMP: Glucose 122, BUN 10, creatinine 0.7, sodium 132, potassium 4.1, chloride 92, carbon dioxide 32, calcium 9.2, protein 7.2, albumin 4.5, AST 30, ALT 25, alkaline phosphatase 56, bilirubin 0.7,   · CBC: WBC 8.9, RBC 5.25, hemoglobin 15.7, hematocrit 46.8, MCV 89.1, MCH 29.9, MCHC 33.5, RDW 13.3, platelets 181, MPV 8.6, lymphocytes 23.2, monocytes 11.1, eos 4.9, basos 0.3, neutrophils 60.5    Electronically signed by ESDRAS Marin, 11/17/22, 8:59 AM EST.

## 2023-06-10 NOTE — PROGRESS NOTES
Subjective:     Encounter Date:06/14/2023      Patient ID: Papo Hoang is a 72 y.o. .  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     SELF REFERRED  INTERNIST: Marcus Colunga MD.    Chief Complaint:   Chief Complaint   Patient presents with   • Hypertensive heart disease without heart failure     Problem List:  1. Hypertensive cardiovascular disease:  a. Remote stress test approximately 2011; negative for patient-diet deficit  b. Residual class I symptoms with acceptable EKG, April 2019, with acceptable echocardiographic exercise stress test suggestive of low probability for significant focal obstructive coronary artery disease with preserved systolic left ventricular function following exercise to 79% predicted exercise capacity and 107% of predicted maximum heart rate suggestive of mild physical deconditioning and cardiac CT calcium score of 26.3, May 2019.  c. Residual class I symptoms, May 2019, June 2020, June 2021, June 2022, June 2023  2. Hypertension - probably essential  3. Hyperlipidemia;  ASCVD 10-year risk 17.4%, 10.4% if treated, patient discontinued rosuvastatin January 2020 due to fatigue and myalgias   4. Prediabetes; hemoglobin A1c 5.9% April 2023  5. BPH  6. GERD  7. Hyperplastic colon polyp  8. Osteoarthritis  9. Pedal neuropathy  10. Former tobacco use: One pack per day x20 years; quit 27 years ago  11. Alcohol use; 3-4 beers per day  12. Osteoarthritis  13. Surgical history:  a. Vasectomy  b. Right orchiectomy  c. EGD/colonoscopy  d. Tonsillectomy    Allergies   Allergen Reactions   • Amoxicillin Diarrhea   • Brompheniramine Nausea Only   • Cortisone Rash   • Erythromycin Rash   • Iodine Rash       Current Outpatient Medications   Medication Instructions   • aspirin 81 mg, Oral, As Needed   • Cranberry-Vitamin C-Probiotic (AZO CRANBERRY PO) Oral, Daily   • fluticasone (FLONASE) 50 MCG/ACT nasal spray 2 sprays, Nasal, Daily   •  GARLIC PO 1,000 mg, Oral, Daily   • hydroCHLOROthiazide (HYDRODIURIL) 25 mg, Oral, Daily   • levocetirizine (XYZAL) 5 mg, Oral, Every Evening   • losartan (COZAAR) 100 mg, Oral, Daily   • metoprolol succinate XL (TOPROL-XL) 50 mg, Oral, Daily   • Omega-3 Fatty Acids (OMEGA 3 PO) 1 tablet, Oral, Daily, With fish oil   • omeprazole (PRILOSEC) 20 mg, Oral, Every Other Day   • Probiotic Product (Squirro PO) Oral, Daily         HISTORY OF PRESENT ILLNESS:  Patient returns for scheduled 12-month follow up.  The patient brought his blood pressure log with him today for me to review and his blood pressures have been in the 140s-150s/70s-80s with heart rates in the mid 50s- 60s.  He denies any chest pain, shortness of breath, palpitations, presyncope, or syncope.  He has not had any hospitalizations, surgeries, or new diagnoses since he was last seen.  His wife had a sinus infection last month and then he developed symptoms about a week later but he had fatigue and loss of appetite as well.  He did not get tested for COVID but thinks that he may have had it.  He had a cough and was given cough medication but became jittery with it and discontinued it with alleviation of symptoms.  He brought his recent laboratory testing results with him today for me to review.  He is trying to stay active with some walking.        ROS   All other systems reviewed and otherwise negative.      ECG 12 Lead    Date/Time: 6/14/2023 12:33 PM  Performed by: Gia Starkey APRN  Authorized by: Gia Starkey APRN   Rhythm comments: Sinus bradycardia, otherwise normal ECG, 50bpm,  ms,  ms, QTc 393 ms, sinus bradycardia has replaced sinus rhythm compared to ECG in April 2019                 Objective:       Vitals:    06/14/23 1049 06/14/23 1050 06/14/23 1229   BP: 169/73 (!) 186/61 152/78   BP Location: Left arm Left arm Right arm   Patient Position: Sitting Standing Sitting   Pulse: 50     SpO2: 93%     Weight: 98.9  "kg (218 lb)     Height: 188 cm (74\")       Body mass index is 27.99 kg/m².  Last weight June 2022 was 216 pounds  Constitutional:       Appearance: Healthy appearance. Not in distress.   Neck:      Vascular: No JVR. JVD normal.   Pulmonary:      Effort: Pulmonary effort is normal.      Breath sounds: Normal breath sounds. No wheezing. No rhonchi. No rales.   Chest:      Chest wall: Not tender to palpatation.   Cardiovascular:      PMI at left midclavicular line. Normal rate. Regular rhythm. Normal S1. Normal S2.       Murmurs: There is a grade 2/6 systolic murmur at the URSB.      No gallop.  No click. No rub.   Pulses:     Dorsalis pedis: 1+ bilaterally.     Posterior tibial: 1+ bilaterally.  Edema:     Peripheral edema absent.   Abdominal:      General: Bowel sounds are normal.      Palpations: Abdomen is soft.      Tenderness: There is no abdominal tenderness.   Musculoskeletal: Normal range of motion.         General: No tenderness. Skin:     General: Skin is warm and dry.   Neurological:      General: No focal deficit present.      Mental Status: Alert and oriented to person, place and time.         Lab Review: No new labs to review  4/15/2022:  · TSH 1.52  · PSA 0.77  · Hemoglobin A1c 5.9%  · Lipid panel: Cholesterol 110, triglycerides 171, HDL 42, LDL 34  · CMP: Glucose 122, BUN 10, creatinine 0.7, sodium 132, potassium 4.1, chloride 92, carbon dioxide 32, calcium 9.2, protein 7.2, albumin 4.5, AST 30, ALT 25, alkaline phosphatase 56, bilirubin 0.7,   · CBC: WBC 8.9, RBC 5.25, hemoglobin 15.7, hematocrit 46.8, MCV 89.1, MCH 29.9, MCHC 33.5, RDW 13.3, platelets 181, MPV 8.6, lymphocytes 23.2, monocytes 11.1, eos 4.9, basos 0.3, neutrophils 60.5    5/9/2023 (reviewed with patient in office today):  PSA 1.2  Vitamin D-54.2    4/27/2023 (reviewed with patient in office today):  · CBC: WBC 6, RBC 5.38, hemoglobin 15.8, hematocrit 45.8, MCV 85.1, MCH 29.4, MCHC 34.5, RDW 12.9, platelets 211, MPV 9.3, " neutrophils 50, lymphocytes 31.4, monocytes 11.9, eos 6, basos 0.7  · Hemoglobin A1c 5.9%  · CMP: Glucose 113, BUN 12, creatinine 0.7, sodium 140, potassium 4.9, chloride 101, carbon dioxide 31, calcium 9.5, protein 6.8, albumin 4.2, AST 39, alkaline phosphatase 61, bilirubin 0.7, GFR 98, ALT 44  · Lipid panel: Cholesterol 106, triglycerides 99, HDL 39, LDL 47  · Vitamin D- 53.9      Advance Care Planning   ACP discussion was held with the patient during this visit. Patient has an advance directive (not in EMR), copy requested.            Assessment:       Overall continued acceptable course with no new interim cardiopulmonary complaints with good functional status. We will defer additional diagnostic or therapeutic intervention from a cardiac perspective at this time.  The patient's blood pressure is elevated so I will increase hydrochlorothiazide to 25 mg daily.  He will continue monitoring his blood pressure and heart rate at home.     Diagnosis Plan   1. Hypertensive heart disease without heart failure  No recurrent angina pectoris or CHF on current activity schedule; continue current treatment other than increasing hydrochlorothiazide to 25 mg daily.      2. Hyperlipidemia LDL goal <70   Acceptable lipid panel April 2023, continue omega-3 fatty acids         3. Prediabetes   Heart healthy diet, increase physical activity as tolerated             Plan:         1. Patient to continue current medications and close follow up with the above providers.  2. Tentative cardiology follow up in 1 year or patient may return sooner PRN.  3. Increase hydrochlorothiazide to 25 mg daily and continue monitoring blood pressure and heart rate at home    Electronically signed by ESDRAS Norris, 06/14/23, 12:39 PM EDT.

## 2023-06-14 ENCOUNTER — OFFICE VISIT (OUTPATIENT)
Dept: CARDIOLOGY | Facility: CLINIC | Age: 73
End: 2023-06-14
Payer: MEDICARE

## 2023-06-14 VITALS
SYSTOLIC BLOOD PRESSURE: 152 MMHG | OXYGEN SATURATION: 93 % | WEIGHT: 218 LBS | HEART RATE: 50 BPM | BODY MASS INDEX: 27.98 KG/M2 | DIASTOLIC BLOOD PRESSURE: 78 MMHG | HEIGHT: 74 IN

## 2023-06-14 DIAGNOSIS — I11.9 HYPERTENSIVE HEART DISEASE WITHOUT HEART FAILURE: Primary | ICD-10-CM

## 2023-06-14 DIAGNOSIS — E78.5 HYPERLIPIDEMIA LDL GOAL <70: ICD-10-CM

## 2023-06-14 DIAGNOSIS — R73.03 PREDIABETES: ICD-10-CM

## 2023-06-14 PROCEDURE — 1160F RVW MEDS BY RX/DR IN RCRD: CPT | Performed by: NURSE PRACTITIONER

## 2023-06-14 PROCEDURE — 99214 OFFICE O/P EST MOD 30 MIN: CPT | Performed by: NURSE PRACTITIONER

## 2023-06-14 PROCEDURE — 3078F DIAST BP <80 MM HG: CPT | Performed by: NURSE PRACTITIONER

## 2023-06-14 PROCEDURE — 1159F MED LIST DOCD IN RCRD: CPT | Performed by: NURSE PRACTITIONER

## 2023-06-14 PROCEDURE — 93000 ELECTROCARDIOGRAM COMPLETE: CPT | Performed by: NURSE PRACTITIONER

## 2023-06-14 PROCEDURE — 3077F SYST BP >= 140 MM HG: CPT | Performed by: NURSE PRACTITIONER

## 2023-06-14 RX ORDER — LEVOCETIRIZINE DIHYDROCHLORIDE 5 MG/1
5 TABLET, FILM COATED ORAL EVERY EVENING
COMMUNITY

## 2023-06-14 RX ORDER — HYDROCHLOROTHIAZIDE 25 MG/1
25 TABLET ORAL DAILY
Qty: 90 TABLET | Refills: 3 | Status: SHIPPED | OUTPATIENT
Start: 2023-06-14

## 2023-06-14 RX ORDER — FLUTICASONE PROPIONATE 50 MCG
2 SPRAY, SUSPENSION (ML) NASAL DAILY
COMMUNITY

## 2023-06-14 RX ORDER — GABAPENTIN 100 MG/1
CAPSULE ORAL
COMMUNITY
Start: 2023-05-01 | End: 2023-06-14

## 2023-12-01 ENCOUNTER — DOCUMENTATION (OUTPATIENT)
Dept: CARDIOLOGY | Facility: CLINIC | Age: 73
End: 2023-12-01
Payer: MEDICARE

## 2023-12-01 NOTE — PROGRESS NOTES
I was able to review the patient's laboratory testing from 11/15/2023.  His blood counts were normal.  His electrolytes, kidney function, and liver function were normal except glucose was just barely elevated at 113.  Cholesterol levels look perfect.  Thyroid function was normal.  Hemoglobin A1c was a little elevated at 5.8% but this is a little lower than his studies in April 2023 when it was 5.9%.  Vitamin D levels were normal.  I would continue his current cardiac medications, adhere to a heart healthy diet, and stay physically active.I will have ARACELI Sotelo call the patient back with results/recommendations.             11/15/2023:  CBC: WBC 5.7, RBC 5.09, hemoglobin 15.4, hematocrit 45.6, MCV 90, MCH 30.3, MCHC 33.8, RDW 12.3, platelets 206, neutrophils 51, left/32, monocytes 10, eos 5, basos 1  CMP: Glucose 113, BUN 8, creatinine 0.8, GFR 94, sodium 139, potassium 4.7, chloride 98, carbon dioxide 27, calcium 9.8, protein 6.2, albumin 4.6, globulin 1.6, bilirubin 0.6, alkaline phosphatase 59, AST 32, ALT 41  Lipid panel: Cholesterol 129, triglycerides 90, HDL 49, LDL 63  TSH 1.49  Hemoglobin A1c 5.8%  Vitamin D-61.7    Electronically signed by ESDRAS Norris, 12/01/23, 4:41 PM EST.

## 2023-12-04 ENCOUNTER — TELEPHONE (OUTPATIENT)
Dept: CARDIOLOGY | Facility: CLINIC | Age: 73
End: 2023-12-04
Payer: MEDICARE

## 2023-12-04 NOTE — TELEPHONE ENCOUNTER
"Spoke with Pts wife about recent lab work per Gia Starkey, APRN:    \"His blood counts were normal.  His electrolytes, kidney function, and liver function were normal except glucose was just barely elevated at 113.  Cholesterol levels look perfect.  Thyroid function was normal.  Hemoglobin A1c was a little elevated at 5.8% but this is a little lower than his studies in April 2023 when it was 5.9%.  Vitamin D levels were normal.  I would continue his current cardiac medications, adhere to a heart healthy diet, and stay physically active\"    Pt was agreeable and verbalized understanding  "

## 2024-06-12 NOTE — PROGRESS NOTES
Subjective:     Encounter Date:06/14/2024      Patient ID: Papo Hoang is a 73 y.o.  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    Chief Complaint:   Chief Complaint   Patient presents with    Hypertensive heart disease without heart failure     1-Yr F/U     Problem List:  Hypertensive cardiovascular disease:  Remote stress test approximately 2011; negative for patient-diet deficit  Residual class I symptoms with acceptable EKG, April 2019, with acceptable echocardiographic exercise stress test suggestive of low probability for significant focal obstructive coronary artery disease with preserved systolic left ventricular function following exercise to 79% predicted exercise capacity and 107% of predicted maximum heart rate suggestive of mild physical deconditioning and cardiac CT calcium score of 26.3, May 2019.  Residual class I symptoms, May 2019, June 2020, June 2021, June 2022, June 2023, June 2024  Hypertension - probably essential  Hyperlipidemia;  ASCVD 10-year risk 17.4%, 10.4% if treated, patient discontinued rosuvastatin January 2020 due to fatigue and myalgias   Prediabetes; hemoglobin A1c 5.9% April 2023, 6.1% May 2024  BPH  GERD  Hyperplastic colon polyp  Osteoarthritis  Pedal neuropathy  Former tobacco use: One pack per day x20 years; quit 27 years ago  Alcohol use; 3-4 beers per day  Osteoarthritis  Surgical history:  Vasectomy  Right orchiectomy  EGD/colonoscopy  Tonsillectomy  Right carpal tunnel release April 2024    Allergies   Allergen Reactions    Amoxicillin Diarrhea    Brompheniramine Nausea Only    Amoxicillin-Pot Clavulanate Unknown - Low Severity    Lansoprazole Unknown - Low Severity    Potassium Unknown - Low Severity    Clindamycin Rash    Cortisone Rash    Erythromycin Rash    Iodine Rash       Current Outpatient Medications   Medication Instructions    aspirin 81 mg, Oral, As Needed    Cranberry-Vitamin C-Probiotic  (AZO CRANBERRY PO) Oral, Daily    fluticasone (FLONASE) 50 MCG/ACT nasal spray 2 sprays, Nasal, Daily    GARLIC PO 1,000 mg, Oral, Daily    hydroCHLOROthiazide 25 mg, Oral, Daily    levocetirizine (XYZAL) 5 mg, Oral, Every Evening    losartan (COZAAR) 100 mg, Oral, Daily    meloxicam (MOBIC) 15 MG tablet     metoprolol succinate XL (TOPROL-XL) 50 mg, Oral, Daily    Omega-3 Fatty Acids (OMEGA 3 PO) 1 tablet, Oral, Daily, With fish oil    omeprazole (PRILOSEC) 20 mg, Oral, Every Other Day    Probiotic Product (Dimdim PO) Oral, Daily         HISTORY OF PRESENT ILLNESS:  Patient is here for 12-month follow-up.  At his last appointment I increased hydrochlorothiazide to 25 mg daily.  His blood pressure is normally around 138/70.  He will continue to monitor this at home.  He had right carpal tunnel surgery in April 2024.  The patient recently was having some hand swelling and had labs that were negative for gout.  He was started on meloxicam and I encouraged the patient to decrease the dose or eliminate this medication if possible.  He denies any chest pain, shortness of breath, palpitations, dizziness, presyncope, or syncope.  He does some walking for activity.  He brought his laboratory testing results with him today for me to review and these were reviewed in office.  He and his wife live a very stress-free life.  The patient's A1c had elevated but he says that he drinks sweet tea and has 3-4 beers per day and was encouraged to decrease his consumption of these.      ROS   All other systems reviewed and otherwise negative.      ECG 12 Lead    Date/Time: 6/14/2024 11:16 AM  Performed by: Gia Starkey APRN    Authorized by: Gia Starkey APRN  Rhythm comments: Marked sinus bradycardia, abnormal ECG, 42 bpm,  ms, QTc 372 ms,  ms, heart rate lower than last ECG June 2023             Objective:       Vitals:    06/14/24 1059 06/14/24 1101 06/14/24 1146   BP: 144/80 152/80 138/68   BP  "Location: Right arm Right arm Right arm   Patient Position: Sitting Standing Sitting   Cuff Size: Adult Adult    Pulse: (!) 42 (!) 42    SpO2: 97% 97%    Weight: 101 kg (222 lb 3.2 oz)     Height: 188 cm (74\")       Body mass index is 28.53 kg/m².  Wt Readings from Last 2 Encounters:   06/14/24 101 kg (222 lb 3.2 oz)   06/14/23 98.9 kg (218 lb)        Constitutional:       Appearance: Healthy appearance. Not in distress.   Neck:      Vascular: No JVR. JVD normal.   Pulmonary:      Effort: Pulmonary effort is normal.      Breath sounds: Normal breath sounds. No wheezing. No rhonchi. No rales.   Chest:      Chest wall: Not tender to palpatation.   Cardiovascular:      PMI at left midclavicular line. Bradycardia present. Regular rhythm. Normal S1. Normal S2.       Murmurs: There is a grade 2/6 systolic murmur at the URSB.      No gallop.  No click. No rub.   Pulses:     Intact distal pulses.   Edema:     Peripheral edema absent.   Abdominal:      General: Bowel sounds are normal.      Palpations: Abdomen is soft.      Tenderness: There is no abdominal tenderness.   Musculoskeletal: Normal range of motion.         General: No tenderness. Skin:     General: Skin is warm and dry.   Neurological:      General: No focal deficit present.      Mental Status: Alert and oriented to person, place and time.           Lab Review:   4/27/2023:  CBC: WBC 6, RBC 5.38, hemoglobin 15.8, hematocrit 45.8, MCV 85.1, MCH 29.4, MCHC 34.5, RDW 12.9, platelets 211, MPV 9.3, neutrophils 50, lymphocytes 31.4, monocytes 11.9, eos 6, basos 0.7  Hemoglobin A1c 5.9%  CMP: Glucose 113, BUN 12, creatinine 0.7, sodium 140, potassium 4.9, chloride 101, carbon dioxide 31, calcium 9.5, protein 6.8, albumin 4.2, AST 39, alkaline phosphatase 61, bilirubin 0.7, GFR 98, ALT 44  Lipid panel: Cholesterol 106, triglycerides 99, HDL 39, LDL 47  Vitamin D- 53.9     11/15/2023:  CBC: WBC 5.7, RBC 5.09, hemoglobin 15.4, hematocrit 45.6, MCV 90, MCH 30.3, MCHC 33.8, " RDW 12.3, platelets 206, neutrophils 51, left/32, monocytes 10, eos 5, basos 1  CMP: Glucose 113, BUN 8, creatinine 0.8, GFR 94, sodium 139, potassium 4.7, chloride 98, carbon dioxide 27, calcium 9.8, protein 6.2, albumin 4.6, globulin 1.6, bilirubin 0.6, alkaline phosphatase 59, AST 32, ALT 41  Lipid panel: Cholesterol 129, triglycerides 90, HDL 49, LDL 63  TSH 1.49  Hemoglobin A1c 5.8%  Vitamin D-61.7     5/9/2024 (reviewed with patient in office):  TSH-2.43  T4-1.46  CBC: WBC 5.8, RBC 5.67, hemoglobin 16.6, hematocrit 50, MCV 88, MCH 29.3, MCHC 33.2, RDW 12.3, platelets 195, neutrophils 50, lymphocytes 33, side 11, eos 5, basos 1  CMP: Glucose 102, BUN 11, creatinine 0.75, GFR 96, sodium 136, potassium 4.4, chloride 99, carbon oxide 25, calcium 9.6, protein 6.6, albumin 4.4, globulin 2.2, bilirubin 0.8, alkaline phosphatase 73, AST 34, ALT 42  Lipid panel: Cholesterol 114, triglycerides 118, HDL 44, LDL 49  PSA 0.5  Hemoglobin A1c 6.1%  Vitamin D-77.3    Results for orders placed during the hospital encounter of 05/16/19    Adult Stress Echo W/ Cont or Stress Agent if Necessary Per Protocol    Interpretation Summary  · No chest pain or discomfort.  · No significant EKG changes; occasional PVCs and PACs without complex forms.  · Hypertensive BP response; patient did not have any medications this am and instructed to take when test was completed.  · THR of 130/minute achieved at 1:35 minutes; maximum /minute = 107% of MPHR.  · Expected exercise time = 7:40 minutes; actual time = 6:11 minutes; test stopped due to fatigue and hypertension.  · CAROLYNE +21; BMI 28.  · Estimated EF = 67%.  · Left ventricular systolic function is normal.  · Mild tricuspid valve regurgitation is present.  · Normal right ventricular cavity size, wall thickness, systolic function and septal motion noted.  · There is no evidence of a left ventricular mass or thrombus present.  · No evidence of pulmonary hypertension is present.  · There is  no evidence of pericardial effusion.  · No significant structural valvular abnormality demonstrated.  · Acceptable echocardiographic exercise stress test suggestive of low probability for significant focal obstructive coronary artery disease with preserved systolic left ventricular function following exercise to 79% predicted exercise capacity and 107% of predicted maximum heart rate suggestive of mild physical deconditioning.            Advance Care Planning   ACP discussion was held with the patient during this visit. Patient has an advance directive (not in EMR), copy requested.      Assessment:     Overall continued acceptable course with no new interim cardiopulmonary complaints with good functional status. We will defer additional diagnostic or therapeutic intervention from a cardiac perspective at this time.  He will decrease his Toprol to 25 mg daily and add amlodipine 2.5 mg daily.  He will call back in 1 week with blood pressure and heart rate readings.       Diagnosis Plan   1. Hypertensive heart disease without heart failure  No recurrent angina pectoris or CHF on current activity schedule; continue current treatment       2. Hyperlipidemia LDL goal <70  Good lipid panel May 2024, continue omega-3 fatty acids      3. Prediabetes  Decrease sweet tea/beer consumption   4.  Sinus bradycardia: Decrease Toprol to 25 mg daily and call back in 1 week with blood pressure and heart rate readings          Plan:         Patient to continue current medications and close follow up with the above providers.  Tentative cardiology follow up in 1 year or patient may return sooner PRN.  He will decrease his Toprol to 25 mg daily and add amlodipine 2.5 mg daily.  He will call back in 1 week with blood pressure and heart rate readings.      Electronically signed by ESDRAS Norris, 06/14/24, 11:50 AM EDT.

## 2024-06-14 ENCOUNTER — OFFICE VISIT (OUTPATIENT)
Dept: CARDIOLOGY | Facility: CLINIC | Age: 74
End: 2024-06-14
Payer: MEDICARE

## 2024-06-14 ENCOUNTER — PATIENT ROUNDING (BHMG ONLY) (OUTPATIENT)
Dept: CARDIOLOGY | Facility: CLINIC | Age: 74
End: 2024-06-14
Payer: MEDICARE

## 2024-06-14 VITALS
OXYGEN SATURATION: 97 % | HEIGHT: 74 IN | HEART RATE: 42 BPM | SYSTOLIC BLOOD PRESSURE: 138 MMHG | WEIGHT: 222.2 LBS | BODY MASS INDEX: 28.52 KG/M2 | DIASTOLIC BLOOD PRESSURE: 68 MMHG

## 2024-06-14 DIAGNOSIS — I11.9 HYPERTENSIVE HEART DISEASE WITHOUT HEART FAILURE: Primary | ICD-10-CM

## 2024-06-14 DIAGNOSIS — R73.03 PREDIABETES: ICD-10-CM

## 2024-06-14 DIAGNOSIS — R00.1 BRADYCARDIA, SINUS: ICD-10-CM

## 2024-06-14 DIAGNOSIS — E78.5 HYPERLIPIDEMIA LDL GOAL <70: ICD-10-CM

## 2024-06-14 PROCEDURE — 93000 ELECTROCARDIOGRAM COMPLETE: CPT | Performed by: NURSE PRACTITIONER

## 2024-06-14 PROCEDURE — 99214 OFFICE O/P EST MOD 30 MIN: CPT | Performed by: NURSE PRACTITIONER

## 2024-06-14 PROCEDURE — 3075F SYST BP GE 130 - 139MM HG: CPT | Performed by: NURSE PRACTITIONER

## 2024-06-14 PROCEDURE — 3078F DIAST BP <80 MM HG: CPT | Performed by: NURSE PRACTITIONER

## 2024-06-14 PROCEDURE — 1160F RVW MEDS BY RX/DR IN RCRD: CPT | Performed by: NURSE PRACTITIONER

## 2024-06-14 PROCEDURE — 1159F MED LIST DOCD IN RCRD: CPT | Performed by: NURSE PRACTITIONER

## 2024-06-14 RX ORDER — MELOXICAM 15 MG/1
TABLET ORAL
COMMUNITY
Start: 2024-06-13

## 2024-06-14 RX ORDER — AMLODIPINE BESYLATE 2.5 MG/1
2.5 TABLET ORAL DAILY
Qty: 90 TABLET | Refills: 3 | Status: SHIPPED | OUTPATIENT
Start: 2024-06-14

## 2024-06-14 RX ORDER — METOPROLOL SUCCINATE 25 MG/1
25 TABLET, EXTENDED RELEASE ORAL DAILY
Qty: 90 TABLET | Refills: 3 | Status: SHIPPED | OUTPATIENT
Start: 2024-06-14

## 2024-06-14 NOTE — PROGRESS NOTES
June 14, 2024    Hello, may I speak with Papo Hoang?    My name is SALO    I am  with [facility]  [address]  [place] [address] [phone].    Before we get started may I verify your date of birth? 1950    I am calling to officially welcome you to our practice and ask about your recent visit. Is this a good time to talk? yes    Tell me about your visit with us. What things went well?  EVERYTHING       We're always looking for ways to make our patients' experiences even better. Do you have recommendations on ways we may improve?  no    Overall were you satisfied with your first visit to our practice? yes       I appreciate you taking the time to speak with me today. Is there anything else I can do for you? no      Thank you, and have a great day.

## 2024-12-16 ENCOUNTER — DOCUMENTATION (OUTPATIENT)
Dept: CARDIOLOGY | Facility: CLINIC | Age: 74
End: 2024-12-16
Payer: MEDICARE

## 2024-12-16 NOTE — PROGRESS NOTES
I was able to review the patient's laboratory testing results.  His labs were not fasting.  Blood counts, electrolytes, kidney function, and liver function were normal.  Cholesterol levels were normal except triglycerides were a little elevated.  Thyroid function and vitamin D were normal.  Hemoglobin A1c was more elevated compared to previous studies.  Would decrease carbs and increase physical activity.  Would continue current cardiac medications. I will have ARACELI Sotelo call the patient back with results/recommendations.       Electronically signed by ESDRAS Norris, 12/16/24, 2:06 PM EST.     11/12/2024:  CBC: WBC 6.9, RBC 5.33, hemoglobin 15.6, hematocrit 46.9, MCV 88, MCH 29.3, MCHC 33.3, RDW 12.3, platelets 210, neutrophils 55, lymphocytes 27, monocytes 11, eos 6, basos 1  CMP: Glucose 106, BUN 12, creatinine 0.82, GFR 93, sodium 135, potassium 4.4, chloride 96, carbon dioxide 26, calcium 9.5, protein 6.5, albumin 4.4, globulin 2.1, bilirubin 0.8, alkaline phosphatase 74, AST 37, ALT 40  Lipid panel: Cholesterol 124, triglycerides 188, HDL 44, LDL 49  TSH 2.93  Hemoglobin A1c 6.5%  Vitamin D-53.7

## 2025-03-17 RX ORDER — AMLODIPINE BESYLATE 2.5 MG/1
2.5 TABLET ORAL DAILY
Qty: 90 TABLET | Refills: 0 | Status: SHIPPED | OUTPATIENT
Start: 2025-03-17

## 2025-06-02 NOTE — PROGRESS NOTES
Subjective:     Encounter Date:06/06/2025      Patient ID: Papo Hoang is a 74 y.o.  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    PCP: ESDRAS Riddle    Chief Complaint:   Chief Complaint   Patient presents with    Hypertensive heart disease without heart failure     Problem List:  Hypertensive cardiovascular disease:  Remote stress test approximately 2011; negative for patient-diet deficit  Residual class I symptoms with acceptable EKG, April 2019, with acceptable echocardiographic exercise stress test suggestive of low probability for significant focal obstructive coronary artery disease with preserved systolic left ventricular function following exercise to 79% predicted exercise capacity and 107% of predicted maximum heart rate suggestive of mild physical deconditioning and cardiac CT calcium score of 26.3, May 2019.  Residual class I symptoms, May 2019, June 2020, June 2021, June 2022, June 2023, June 2024, June 2025  Hypertension - probably essential  Hyperlipidemia;  ASCVD 10-year risk 17.4%, 10.4% if treated, patient discontinued rosuvastatin January 2020 due to fatigue and myalgias   Prediabetes; hemoglobin A1c 5.9% April 2023, 6.1% May 2024, 5.9% March 2025  BPH  GERD  Hyperplastic colon polyp  Osteoarthritis  Pedal neuropathy  Former tobacco use: One pack per day x20 years; quit 27 years ago  Alcohol use; 3-4 beers per day  Osteoarthritis  Bradycardia  Surgical history:  Vasectomy  Right orchiectomy  EGD/colonoscopy  Tonsillectomy  Right carpal tunnel release April 2024    Allergies   Allergen Reactions    Amoxicillin Diarrhea    Brompheniramine Nausea Only    Amoxicillin-Pot Clavulanate Unknown - Low Severity    Lansoprazole Unknown - Low Severity    Potassium Unknown - Low Severity    Clindamycin Rash    Cortisone Rash    Erythromycin Rash    Iodine Rash       Current Outpatient Medications   Medication Instructions     amLODIPine (NORVASC) 2.5 mg, Oral, Daily    aspirin 81 mg, As Needed    Cranberry-Vitamin C-Probiotic (AZO CRANBERRY PO) Daily    fluticasone (FLONASE) 50 MCG/ACT nasal spray 2 sprays, Daily    gabapentin (NEURONTIN) 100 mg, 3 Times Daily    gabapentin (NEURONTIN) 300 mg, 3 Times Daily    GARLIC PO 1,000 mg, Daily    hydroCHLOROthiazide 25 mg, Oral, Daily    levocetirizine (XYZAL) 5 mg, Every Evening    losartan (COZAAR) 100 mg, Daily    meloxicam (MOBIC) 15 MG tablet     metoprolol succinate XL (TOPROL-XL) 25 mg, Oral, Daily    Omega-3 Fatty Acids (OMEGA 3 PO) 1 tablet, Daily    omeprazole (PRILOSEC) 20 mg, Every Other Day    Probiotic Product (GoNabit PO) Daily         HISTORY OF PRESENT ILLNESS:  The patient is here for a 1 year follow-up.  At his last appointment his Toprol was decreased to 25 mg daily and amlodipine 2.5 mg daily was added.  He says his home blood pressures are around 130/70.  He will start monitoring this more frequently at home.  He says his heart rates are always in the 50s-60s.  He denies any chest pain, shortness of breath, palpitations, dizziness, presyncope, or syncope.  His hemoglobin A1c has decreased to 5.9%.  He is trying to decrease sweets but does like yocasta food cake and banana bread.  He walked 8000 steps a couple days ago up and down some hills without cardiopulmonary complaints.  He has not had any hospitalizations, surgeries, or new diagnoses since he was last seen.  He brought his labs with him for me to review and they are listed below.      ROS   All other systems reviewed and otherwise negative.      ECG 12 Lead    Date/Time: 6/6/2025 11:22 AM  Performed by: Gia Starkey APRN    Authorized by: Gia Starkey APRN  Rhythm comments: Sinus bradycardia, otherwise normal ECG, 56 bpm,  ms,  ms, QTc 405 ms, heart rate improved compared to ECG June 2024             Objective:       Vitals:    06/06/25 1058 06/06/25 1100 06/06/25 1131   BP: 152/62  "156/70 140/70   BP Location: Left arm Left arm Left arm   Patient Position: Sitting Standing Sitting   Cuff Size: Adult Adult    Pulse: 56 56    SpO2: 96% 97%    Weight: 94.9 kg (209 lb 3.2 oz)     Height: 188 cm (74\")       Body mass index is 26.86 kg/m².  Wt Readings from Last 2 Encounters:   06/06/25 94.9 kg (209 lb 3.2 oz)   06/14/24 101 kg (222 lb 3.2 oz)        Constitutional:       Appearance: Healthy appearance. Not in distress.   Neck:      Vascular: No JVR. JVD normal.   Pulmonary:      Effort: Pulmonary effort is normal.      Breath sounds: Normal breath sounds. No wheezing. No rhonchi. No rales.   Chest:      Chest wall: Not tender to palpatation.   Cardiovascular:      PMI at left midclavicular line. Bradycardia present. Regular rhythm. Normal S1. Normal S2.       Murmurs: There is a grade 2/6 systolic murmur at the URSB.      No gallop.  No click. No rub.   Pulses:     Intact distal pulses.   Edema:     Peripheral edema absent.   Abdominal:      General: Bowel sounds are normal.      Palpations: Abdomen is soft.      Tenderness: There is no abdominal tenderness.   Musculoskeletal: Normal range of motion.         General: No tenderness. Skin:     General: Skin is warm and dry.   Neurological:      General: No focal deficit present.      Mental Status: Alert and oriented to person, place and time.           Lab Review:     11/12/2024:  CBC: WBC 6.9, RBC 5.33, hemoglobin 15.6, hematocrit 46.9, MCV 88, MCH 29.3, MCHC 33.3, RDW 12.3, platelets 210, neutrophils 55, lymphocytes 27, monocytes 11, eos 6, basos 1  CMP: Glucose 106, BUN 12, creatinine 0.82, GFR 93, sodium 135, potassium 4.4, chloride 96, carbon dioxide 26, calcium 9.5, protein 6.5, albumin 4.4, globulin 2.1, bilirubin 0.8, alkaline phosphatase 74, AST 37, ALT 40  Lipid panel: Cholesterol 124, triglycerides 188, HDL 44, LDL 49  TSH 2.93  Hemoglobin A1c 6.5%  Vitamin D-53.7    3/26/2025:  TSH 2.11  Free T4-1 0.28  CBC: WBC 7.4, RBC 5.52, hemoglobin " 16, hematocrit 48.9, MCV 89, MCH 29, MCHC 32.7, RDW 12.5, platelets 224, neutrophils 52, lymphocytes 31, monocytes 10, eos 6, basos 1  CMP: Glucose 102, BUN 12, creatinine 0.7, GFR 97, sodium 138, potassium 4.8, chloride 97, carbon oxide 29, calcium 9.8, protein 6.7, albumin 4.7, globulin 2, bilirubin 0.8, alkaline phosphatase 67, AST 22, ALT 25  Lipid panel: Cholesterol 119, triglycerides 82, HDL 48, LDL 55  Hemoglobin A1c 5.9%  Vitamin D-61.6        Results for orders placed during the hospital encounter of 05/16/19    Adult Stress Echo W/ Cont or Stress Agent if Necessary Per Protocol    Interpretation Summary  · No chest pain or discomfort.  · No significant EKG changes; occasional PVCs and PACs without complex forms.  · Hypertensive BP response; patient did not have any medications this am and instructed to take when test was completed.  · THR of 130/minute achieved at 1:35 minutes; maximum /minute = 107% of MPHR.  · Expected exercise time = 7:40 minutes; actual time = 6:11 minutes; test stopped due to fatigue and hypertension.  · CAROLYNE +21; BMI 28.  · Estimated EF = 67%.  · Left ventricular systolic function is normal.  · Mild tricuspid valve regurgitation is present.  · Normal right ventricular cavity size, wall thickness, systolic function and septal motion noted.  · There is no evidence of a left ventricular mass or thrombus present.  · No evidence of pulmonary hypertension is present.  · There is no evidence of pericardial effusion.  · No significant structural valvular abnormality demonstrated.  · Acceptable echocardiographic exercise stress test suggestive of low probability for significant focal obstructive coronary artery disease with preserved systolic left ventricular function following exercise to 79% predicted exercise capacity and 107% of predicted maximum heart rate suggestive of mild physical deconditioning.                Assessment:     Overall continued acceptable course with no new  interim cardiopulmonary complaints with good functional status. We will defer additional diagnostic or therapeutic intervention from a cardiac perspective at this time.      Diagnosis Plan   1. Hypertensive heart disease without heart failure  No recurrent angina pectoris or CHF on current activity schedule; continue current treatment       2. Essential hypertension  Patient to start monitoring blood pressure and heart rate more frequently at home      3. Dyslipidemia  Good lipid panel March 2025, continue heart healthy diet and physical activity as tolerated      4. Bradycardia, sinus  Stable      5. Prediabetes  Heart healthy diet, physical activity as tolerated             Plan:         Patient to continue current medications and close follow up with the above providers.  Tentative cardiology follow up in 1 year or patient may return sooner PRN.  Patient to start monitoring blood pressure and heart rate more frequently at home    Electronically signed by ESDRAS Norris, 06/06/25, 11:34 AM EDT.

## 2025-06-06 ENCOUNTER — OFFICE VISIT (OUTPATIENT)
Dept: CARDIOLOGY | Facility: CLINIC | Age: 75
End: 2025-06-06
Payer: MEDICARE

## 2025-06-06 VITALS
WEIGHT: 209.2 LBS | OXYGEN SATURATION: 97 % | HEIGHT: 74 IN | HEART RATE: 56 BPM | BODY MASS INDEX: 26.85 KG/M2 | DIASTOLIC BLOOD PRESSURE: 70 MMHG | SYSTOLIC BLOOD PRESSURE: 140 MMHG

## 2025-06-06 DIAGNOSIS — R73.03 PREDIABETES: ICD-10-CM

## 2025-06-06 DIAGNOSIS — R00.1 BRADYCARDIA, SINUS: ICD-10-CM

## 2025-06-06 DIAGNOSIS — E78.5 DYSLIPIDEMIA: ICD-10-CM

## 2025-06-06 DIAGNOSIS — I10 ESSENTIAL HYPERTENSION: ICD-10-CM

## 2025-06-06 DIAGNOSIS — I11.9 HYPERTENSIVE HEART DISEASE WITHOUT HEART FAILURE: Primary | ICD-10-CM

## 2025-06-06 PROCEDURE — 93000 ELECTROCARDIOGRAM COMPLETE: CPT | Performed by: NURSE PRACTITIONER

## 2025-06-06 PROCEDURE — 3078F DIAST BP <80 MM HG: CPT | Performed by: NURSE PRACTITIONER

## 2025-06-06 PROCEDURE — 99214 OFFICE O/P EST MOD 30 MIN: CPT | Performed by: NURSE PRACTITIONER

## 2025-06-06 PROCEDURE — 3077F SYST BP >= 140 MM HG: CPT | Performed by: NURSE PRACTITIONER

## 2025-06-06 RX ORDER — GABAPENTIN 300 MG/1
300 CAPSULE ORAL 3 TIMES DAILY
COMMUNITY
Start: 2025-05-08

## 2025-06-06 RX ORDER — GABAPENTIN 100 MG/1
100 CAPSULE ORAL 3 TIMES DAILY
COMMUNITY
Start: 2025-03-26